# Patient Record
(demographics unavailable — no encounter records)

---

## 2025-02-19 NOTE — HISTORY OF PRESENT ILLNESS
[Oral Contraceptive] : uses oral contraception pills [Y] : Patient is sexually active [Regular Cycle Intervals] : periods have been regular [Frequency: Q ___ days] : menstrual periods occur approximately every [unfilled] days [Menarche Age: ____] : age at menarche was [unfilled] [FreeTextEntry1] : 21 year old female presents for annual examination. Pt. is currently on OCP for contraception. Pt. states prior to starting OCP her menses were regular. She is seeking care at [facility] due to recurrent vaginitis. Pt. states in the past 5 months she has been treated for BV over 4 times. She is looking for prolonged treatment. She complains of foul smelling vaginal odor today.    [PGHxTotal] : 0 [PGHxFullTerm] : 0 [PGHxPremature] : 0 [PGHxAbortions] : 0 [[facility] [PGHxABInduced] : 0 [PGHxABSpont] : 0 [PGHxEctopic] : 0 [PGHxMultBirths] : 0

## 2025-02-19 NOTE — PHYSICAL EXAM
[Chaperone Present] : A chaperone was present in the examining room during all aspects of the physical examination [19353] : A chaperone was present during the pelvic exam. [Appropriately responsive] : appropriately responsive [Alert] : alert [No Acute Distress] : no acute distress [Soft] : soft [Non-tender] : non-tender [Non-distended] : non-distended [No HSM] : No HSM [No Lesions] : no lesions [No Mass] : no mass [Oriented x3] : oriented x3 [Examination Of The Breasts] : a normal appearance [No Masses] : no breast masses were palpable [Labia Majora] : normal [Labia Minora] : normal [Discharge] : a  ~M vaginal discharge was present [Normal] : normal [Uterine Adnexae] : normal

## 2025-02-19 NOTE — PLAN
[FreeTextEntry1] : Encounter for GYN exam   - Pap/GC obtained   OCP surveillance   - Nargis sent to pharmacy   Recurrent vaginitis   - We discussed things to avoid and prolonged use of medication  - Clindamycin sent for initial due to recurrent treatment with metrogel. Pt. wishes to go on prolonged treatment. Will send metrogel to be used 2 x per week for 4-6 months. Recommended use of Boric acid after intercourse as well. Pt. verbalized understanding to medication management.   All questions and concerns addressed during encounter. Pt. agreed to plan of care.  F/U in 6 months